# Patient Record
Sex: MALE | Race: WHITE | NOT HISPANIC OR LATINO | URBAN - METROPOLITAN AREA
[De-identification: names, ages, dates, MRNs, and addresses within clinical notes are randomized per-mention and may not be internally consistent; named-entity substitution may affect disease eponyms.]

---

## 2017-01-15 ENCOUNTER — HOSPITAL ENCOUNTER (OUTPATIENT)
Dept: URGENT CARE | Facility: CLINIC | Age: 22
Setting detail: SPECIMEN
Discharge: HOME | End: 2017-01-15
Attending: FAMILY MEDICINE | Admitting: FAMILY MEDICINE

## 2017-01-15 LAB
ALBUMIN SERPL-MCNC: 3.2 G/DL (ref 3.5–4.8)
ALBUMIN/GLOB SERPL: 0.7 {RATIO} (ref 1–1.7)
ALP SERPL-CCNC: 280 IU/L (ref 32–91)
ALT SERPL-CCNC: 243 IU/L (ref 17–63)
ANION GAP SERPL CALC-SCNC: 14.3 MMOL/L (ref 10–20)
AST SERPL-CCNC: 96 IU/L (ref 15–41)
BILIRUB SERPL-MCNC: 1.7 MG/DL (ref 0.3–1.2)
BUN SERPL-MCNC: 8 MG/DL (ref 8–20)
BUN/CREAT SERPL: 7.3 (ref 6.2–20.3)
CALCIUM SERPL-MCNC: 8.4 MG/DL (ref 8.9–10.3)
CHLORIDE SERPL-SCNC: 95 MMOL/L (ref 101–111)
CONV CO2: 27 MMOL/L (ref 22–32)
CONV TOTAL PROTEIN: 7.9 G/DL (ref 6.1–7.9)
CREAT UR-MCNC: 1.1 MG/DL (ref 0.7–1.2)
GLOBULIN UR ELPH-MCNC: 4.7 G/DL (ref 2.5–3.8)
GLUCOSE SERPL-MCNC: 90 MG/DL (ref 65–99)
POTASSIUM SERPL-SCNC: 4.3 MMOL/L (ref 3.6–5.1)
SODIUM SERPL-SCNC: 132 MMOL/L (ref 136–144)

## 2017-01-17 LAB — CMV IGM SERPL IA-ACNC: NEGATIVE

## 2017-01-18 LAB
EBV AB TO VIRAL CAPSID AG, IGG: ABNORMAL
EBV AB TO VIRAL CAPSID AG, IGM: ABNORMAL
EBV EA AB TITR SER IF: NEGATIVE {TITER}
EBV NA AB TITR SER IF: NEGATIVE {TITER}

## 2018-01-15 ENCOUNTER — OFFICE VISIT (OUTPATIENT)
Dept: FAMILY MEDICINE CLINIC | Facility: CLINIC | Age: 23
End: 2018-01-15

## 2018-01-15 VITALS
HEIGHT: 71 IN | DIASTOLIC BLOOD PRESSURE: 58 MMHG | TEMPERATURE: 97.9 F | BODY MASS INDEX: 24.88 KG/M2 | WEIGHT: 177.7 LBS | HEART RATE: 82 BPM | OXYGEN SATURATION: 99 % | SYSTOLIC BLOOD PRESSURE: 108 MMHG

## 2018-01-15 DIAGNOSIS — Z00.00 HEALTH CARE MAINTENANCE: Primary | ICD-10-CM

## 2018-01-15 DIAGNOSIS — M77.9 TENDONITIS: ICD-10-CM

## 2018-01-15 PROCEDURE — 99395 PREV VISIT EST AGE 18-39: CPT | Performed by: FAMILY MEDICINE

## 2018-01-15 RX ORDER — DEXTROAMPHETAMINE SACCHARATE, AMPHETAMINE ASPARTATE, DEXTROAMPHETAMINE SULFATE AND AMPHETAMINE SULFATE 5; 5; 5; 5 MG/1; MG/1; MG/1; MG/1
40 TABLET ORAL DAILY
COMMUNITY

## 2018-01-15 NOTE — PROGRESS NOTES
"Vera Azevedo is a 22 y.o. male.     Chief Complaint   Patient presents with   • Establish Care     new pt    • Extremity Weakness     left hand weakness cant make a fist x 2 months   • Depression        History of Present Illness     New patient.  Here to get established.  Annual health maintenance examination.  He has not been to a family doctor in some time.    Depression history.  Reported attention deficit disorder.  He is followed by psychiatrist.  He was prescribed Adderall a few months ago.  He states occasionally makes him agitated.  Once he took a couple pills at once and made his heart rate faster.  But better now.  He states his depression has been better.  He has a girlfriend and he feels better about himself.  He gets anxious at times.  He's periodically worried about his left hand right now.  He states when he makes a fist the ring finger does not completely flex.  He wakes up in the morning and it feels a little \"different\".  No numbness.  No pain.  No definite weakness.  Only bothers him in the morning.  He is left-handed.  No particular repetitive motion.  He uses a mouse with his right hand.  He is a student.    Social History   Substance Use Topics   • Smoking status: Never Smoker   • Smokeless tobacco: Never Used   • Alcohol use Yes      Comment: rare       There is no immunization history on file for this patient.    Family History   Problem Relation Age of Onset   • No Known Problems Mother    • No Known Problems Father              The following portions of the patient's history were reviewed and updated as appropriate: allergies, current medications, past family history, past medical history, past social history, past surgical history and problem list.          Review of Systems   Constitutional: Negative.    HENT: Negative.    Respiratory: Negative.    Cardiovascular: Negative.    Gastrointestinal: Negative.  Negative for blood in stool.   Endocrine: Negative.    Genitourinary: " "Negative.  Negative for hematuria.   Musculoskeletal: Negative.    Skin: Negative.    Neurological: Negative.  Negative for weakness, numbness and headaches.   Psychiatric/Behavioral: The patient is nervous/anxious.    All other systems reviewed and are negative.      Objective   Blood pressure 108/58, pulse 82, temperature 97.9 °F (36.6 °C), temperature source Oral, height 180.3 cm (71\"), weight 80.6 kg (177 lb 11.2 oz), SpO2 99 %.  Physical Exam   Constitutional: He is oriented to person, place, and time. He appears well-developed and well-nourished. No distress.   No acute distress.  Nontoxic.   HENT:   Right Ear: Tympanic membrane, external ear and ear canal normal.   Left Ear: Tympanic membrane, external ear and ear canal normal.   Nose: Nose normal.   Mouth/Throat: Oropharynx is clear and moist. No oropharyngeal exudate.   Eyes: Conjunctivae are normal. Right eye exhibits no discharge. Left eye exhibits no discharge. No scleral icterus.   Neck: No thyromegaly present.   Cardiovascular: Normal rate, regular rhythm, normal heart sounds and intact distal pulses.    Pulmonary/Chest: Effort normal and breath sounds normal. No stridor. No respiratory distress. He has no wheezes. He has no rales.   No tachypnea   Abdominal: Soft. Bowel sounds are normal. He exhibits no distension and no mass. There is no tenderness. There is no rebound and no guarding.   Musculoskeletal: He exhibits no edema, tenderness or deformity.   Examination left hand reveals full range of motion.  When he makes a fist with his left hand the left fourth DIP joint appears not completely flex.  Negative Tinel's.  Negative Phalen's.  No thenar or hyperthenar muscle wasting.  The neurovascular tendon status is intact.   Lymphadenopathy:     He has no cervical adenopathy.   Neurological: He is alert and oriented to person, place, and time. He exhibits normal muscle tone. Coordination normal.   Skin: Skin is warm and dry. No rash noted. "   Psychiatric: He has a normal mood and affect. His behavior is normal. Judgment and thought content normal.   Nursing note and vitals reviewed.      Assessment/Plan   Luis was seen today for establish care, extremity weakness and depression.    Diagnoses and all orders for this visit:    Health care maintenance  -     Lipid Panel  -     Comprehensive Metabolic Panel  -     HIV-1 / O / 2 Ag / Antibody 4th Generation    Tendonitis  -     Ambulatory Referral to Hand Surgery      Here for annual complete physical examination.  New patient.    Immunizations.  Reported up-to-date.  He is going to get us records of his immunizations at [facility].    Patient requests routine HIV screening.  He states he has no pelvic or concerns 1 STDs.  No STD symptoms, including no urogenital symptoms.    Inability to completely flex the left fourth DIP joint.  A very subtle finding.  Is making him very anxious.  There is no obvious trigger finger or definitive tendinopathy.  Possible mild tendinitis.  No evidence of carpal tunnel syndrome at this moment.  I offered reassurance, patient like further investigation.  I referred him to a hand doctor.    Anxiety.  History of reported attention deficit disorder.  History of reported depression that appears to be in remission.  He is followed by a psychiatrist.  Patient does state the Adderall makes him agitated at times.  I'm concerned may be increasing some of his anxiety.  I recommend he get back to his psychiatrist for evaluation.  He states he has an appointment later this week.  There is no current evidence of OCD.

## 2018-05-24 ENCOUNTER — OFFICE VISIT (OUTPATIENT)
Dept: FAMILY MEDICINE CLINIC | Facility: CLINIC | Age: 23
End: 2018-05-24

## 2018-05-24 VITALS
SYSTOLIC BLOOD PRESSURE: 112 MMHG | BODY MASS INDEX: 25.09 KG/M2 | WEIGHT: 179.2 LBS | HEIGHT: 71 IN | TEMPERATURE: 97.8 F | OXYGEN SATURATION: 99 % | HEART RATE: 84 BPM | DIASTOLIC BLOOD PRESSURE: 80 MMHG

## 2018-05-24 DIAGNOSIS — M76.31 ILIOTIBIAL BAND TENDINITIS OF RIGHT SIDE: Primary | ICD-10-CM

## 2018-05-24 PROCEDURE — 99213 OFFICE O/P EST LOW 20 MIN: CPT | Performed by: FAMILY MEDICINE

## 2018-05-24 NOTE — PROGRESS NOTES
"Vera Azevedo is a 22 y.o. male.     Chief Complaint   Patient presents with   • Hip Pain     right hip pain NKI, x 2-3 month    • Hand Pain     left hand left x nov, hard to make a fist, decreased strength         History of Present Illness    Right hip pain.  2 or 3 months.  Better with exercise such as running.  Bothers him at nighttime times sometimes.  He feels a click now and then on the right hip.  Mild to moderate symptoms.  Knocking the way of his day-to-day activity.  Worse when he is walking uphill.    Intermittent left hand discomfort.  States it's hard to make a fist.  Questionable decreased strength.  No left-sided facial symptoms.  No left leg symptoms.  No weakness.  No numbness.  No injury.  Previous examinations are unremarkable.      The following portions of the patient's history were reviewed and updated as appropriate: allergies, current medications, past family history, past medical history, past social history, past surgical history and problem list.          Review of Systems   Constitutional: Negative.    Musculoskeletal: Negative for arthralgias.   Neurological: Negative.    Psychiatric/Behavioral: Negative.        Objective   Blood pressure 112/80, pulse 84, temperature 97.8 °F (36.6 °C), temperature source Oral, height 180.3 cm (70.98\"), weight 81.3 kg (179 lb 3.2 oz), SpO2 99 %.  Physical Exam   Constitutional: No distress.   Musculoskeletal:   Right hip full range of motion.  Strength 5 out of 5.  His pain to palpation along the iliotibial band on the right side been no bursitis discomfort.  Hamstrings appear tight.    Examination left hand is unremarkable.       Assessment/Plan   Luis was seen today for hip pain and hand pain.    Diagnoses and all orders for this visit:    Iliotibial band tendinitis of right side  -     Ambulatory Referral to Physical Therapy Evaluate and treat      Iliotibial band syndrome right-sided.  Referral to physical therapy for strengthening and " stretching.  If not improving referral to sports medicine for injection of corticosteroid.    Left hand discomfort.  Exam again is unremarkable.  Likely not a upper motor neuron issue.  Recommend referral to hand surgeon.  I gave the patient name of a hand surgeon in this building they can self refer.  If insurance referral needed let us know.

## 2018-06-08 ENCOUNTER — TREATMENT (OUTPATIENT)
Dept: PHYSICAL THERAPY | Facility: CLINIC | Age: 23
End: 2018-06-08

## 2018-06-08 DIAGNOSIS — M76.31 ILIOTIBIAL BAND TENDINITIS OF RIGHT SIDE: Primary | ICD-10-CM

## 2018-06-08 PROCEDURE — 97014 ELECTRIC STIMULATION THERAPY: CPT | Performed by: PHYSICAL THERAPIST

## 2018-06-08 PROCEDURE — 97161 PT EVAL LOW COMPLEX 20 MIN: CPT | Performed by: PHYSICAL THERAPIST

## 2018-06-08 PROCEDURE — 97110 THERAPEUTIC EXERCISES: CPT | Performed by: PHYSICAL THERAPIST

## 2018-06-08 NOTE — PROGRESS NOTES
Physical Therapy Initial Evaluation and Plan of Care    TIME IN 1103 TIME OUT 1141  Patient: Luis Azevedo   : 1995  Diagnosis/ICD-10 Code:  Iliotibial band tendinitis of right side [M76.31]  Referring practitioner: Sohail Roe MD    Subjective Evaluation    History of Present Illness  Date of onset: 2018  Mechanism of injury: Pt reports insidious onset of Right leg pain that is aggravated with walking.  Pt reports walking with laptop bag in January or February and bag kept hitting his right side and felt pain so he stopped carrying his bag that way.      PMH: denies previous injury to right lower leg      Subjective comment: Pt also reports decreased  strength and stiffness in Left hand.    Patient Occupation: student Pain  Current pain ratin  At best pain ratin  At worst pain ratin  Location: right lateral thigh  Quality: constant, hurts   Alleviating factors: first getting up in the morning.  Aggravating factors: ambulation (running, getting in and out of shower)  Progression: no change    Hand dominance: left    Diagnostic Tests  No diagnostic tests performed    Treatments  No previous or current treatments  Patient Goals  Patient goals for therapy: decreased pain (running on treadmill without pain, walking at gym)             Objective       Palpation     Right   No palpable tenderness to the rectus femoris.   Hypertonic in the gluteus medius and piriformis. Tenderness of the gluteus medius and piriformis.     Tenderness     Right Hip   Tenderness in the greater trochanter.     Additional Tenderness Details  Right iliotibial band (+) TTP    Strength/Myotome Testing     Right Hip   Planes of Motion   Flexion: 5  Extension: 4    Isolated Muscles   Gluteus maximums: 3+ (increased lateral hip pain)    Right Knee   Extension: 4+    Tests     Right Hip   Negative ORTIZ, KATHY and jae.     Additional Tests Details  TORO adduction drop test R (-) L unable to test due to pain lying on  "Right side  Trendelenburg Right (+)      Ambulation     Observational Gait   Decreased right stance time and left step length.     Comments   Left innominate anterior rotation and medial rotation    Functional Assessment   Squat   Pain, sitting toward left side, left tibial anterior translation beyond toes and right tibial anterior translation beyond toes.     Forward Step Down 6\"   Left Leg  Valgus.     Right Leg  Pain, positive Trendelenburg, ipsilateral trunk side bending and valgus.     Single Leg Stance   Right: 3 (increased pain) seconds         Assessment & Plan     Assessment  Impairments: abnormal gait, impaired balance, impaired physical strength, lacks appropriate home exercise program and pain with function  Assessment details: Pt is pleasant 22 y.o. Male who presents with right anterior and lateral hip pain.  Pt reports no known injury.  Pt demonstrates pain in single-leg stance and with ambulation.  Pt demonstrates weakness and pain with resisted testing for hip abduction and TTP in right gluteus medius indicating strain in gluteus medius.  Pt also demonstrates weak and painful hip flexion resisted test and possible left anterior innominate rotation.  Pt requires skilled physical therapy to address impairments and return to prior level of function including walking and running without pain.   Prognosis: good  Functional Limitations: walking, pushing and uncomfortable because of pain  Goals  Plan Goals: Short-Term Goals - In 2 weeks:  1. Pt will be (I) with initial HEP.  2. Sahrmann for lumbar stabilization 0.1/5 with ability to maintain neutral spine during supine heel slide.  3. Pt will perform single leg balance on right without pain for 15 seconds in order to demonstrate improved balance and tolerance to activity for ambulation and stair navigation.    Long-term Goals - In 4 weeks:  1. Pt will ambulate community distances with equal stance time, step length, and without pain.  2. Pt will navigate " stairs reciprocally without pain.  3. Pt will perform step down from 4 inch step without pain, hip IR or contralateral pelvic drop.  4. Right hip flexion, abduction and extension strength improve to 5-/5 without pain.  5. Right ITB, gluteus medius, and piriformis (-) TTP.  6. LEFS score improve to 60/80 to demonstrate functional improvement.     Plan  Therapy options: will be seen for skilled physical therapy services  Planned modality interventions: cryotherapy, electrical stimulation/Russian stimulation and thermotherapy (hydrocollator packs)  Planned therapy interventions: ADL retraining, balance/weight-bearing training, body mechanics training, functional ROM exercises, gait training, home exercise program, joint mobilization, neuromuscular re-education, soft tissue mobilization, strengthening and therapeutic activities  Frequency: 2x week  Duration in weeks: 4  Treatment plan discussed with: patient        Manual Therapy:    -     mins  18535;  Therapeutic Exercise:    8     mins  57124;     Neuromuscular Aracelis:    -    mins  19568;    Therapeutic Activity:     -     mins  94574;     Gait Training:      -     mins  76573;     Ultrasound:     -     mins  74606;    Electrical Stimulation:    10     mins  24580 ( );  Dry Needling     -     mins self-pay    Timed Treatment:   8   mins   Total Treatment:     38   mins    PT SIGNATURE: Anastacia Hdz, PT, DPT   DATE TREATMENT INITIATED: 6/8/2018    Initial Certification  Certification Period: 9/6/2018  I certify that the therapy services are furnished while this patient is under my care.  The services outlined above are required by this patient, and will be reviewed every 90 days.     PHYSICIAN: Sohail Roe MD      DATE:     Please sign and return via fax to [phone]. Thank you, [facility].

## 2018-06-12 ENCOUNTER — TREATMENT (OUTPATIENT)
Dept: PHYSICAL THERAPY | Facility: CLINIC | Age: 23
End: 2018-06-12

## 2018-06-12 DIAGNOSIS — M76.31 ILIOTIBIAL BAND TENDINITIS OF RIGHT SIDE: Primary | ICD-10-CM

## 2018-06-12 PROCEDURE — 97110 THERAPEUTIC EXERCISES: CPT | Performed by: PHYSICAL THERAPIST

## 2018-06-12 NOTE — PROGRESS NOTES
Physical Therapy Daily Progress Note    Time In 1700  Time Out 1742    Luis Azevedo reports: some improvement in symptoms for 4 hours after stretching last treatment.     Subjective     Objective       Functional Assessment   Squat   Sitting toward left side.      See Exercise, Manual, and Modality Logs for complete treatment.       Assessment & Plan     Assessment  Assessment details: Plan of treatment session modified due to pt late arrival and arriving in jeans.  Pt demonstrated inability to perform hip hike with weight bearing on Right LE due to weakness and pain.  Pt demonstrated increased WB on L during controlled sit <-> stand and was instructed on staggered stance to increase WB on Right.  Pt reported discomfort with heat application in side-lying and treatment was terminated.        Progress per Plan of Care  Apply modalities including head in seated position         Manual Therapy:    -     mins  93291;  Therapeutic Exercise:    30     mins  76793;     Neuromuscular Aracelis:    -    mins  72021;    Therapeutic Activity:     -     mins  16050;     Gait Training:      -     mins  63234;     Ultrasound:     -     mins  44557;    Electrical Stimulation:    -     mins  23204 ( );  Dry Needling     -     mins self-pay    Timed Treatment:   30   mins   Total Treatment:     42   mins    Anastacia Hdz, PT, DPT  Physical Therapist

## 2018-06-13 NOTE — PATIENT INSTRUCTIONS
Pathology and involved anatomy  Purpose of treatment  Appropriate response to treatment  Avoid running, avoid activities that increase pain  Perform weight lifting including bilateral leg press  Continued HEP performance

## 2018-06-15 ENCOUNTER — TREATMENT (OUTPATIENT)
Dept: PHYSICAL THERAPY | Facility: CLINIC | Age: 23
End: 2018-06-15

## 2018-06-15 DIAGNOSIS — M76.31 ILIOTIBIAL BAND TENDINITIS OF RIGHT SIDE: Primary | ICD-10-CM

## 2018-06-15 PROCEDURE — 97530 THERAPEUTIC ACTIVITIES: CPT | Performed by: PHYSICAL THERAPIST

## 2018-06-15 PROCEDURE — 97110 THERAPEUTIC EXERCISES: CPT | Performed by: PHYSICAL THERAPIST

## 2018-06-15 PROCEDURE — 97014 ELECTRIC STIMULATION THERAPY: CPT | Performed by: PHYSICAL THERAPIST

## 2018-06-15 PROCEDURE — 97140 MANUAL THERAPY 1/> REGIONS: CPT | Performed by: PHYSICAL THERAPIST

## 2018-06-15 NOTE — PROGRESS NOTES
Physical Therapy Daily Progress Note    Time In 1301  Time Out 1413    Luis Azevedo reports: doing well, just had follow up with hand doctor     Subjective     Objective       Tests     Additional Tests Details  Supine leg length (+) left longer       See Exercise, Manual, and Modality Logs for complete treatment.       Assessment & Plan     Assessment  Assessment details: Initiated instrument assisted soft tissue massage to right hip and ITB.  Pt reported discomfort during manual treatment and agreed to continue soft tissue massage.  Initiated additional hip strengthening today.  Majority of strengthening performed NWB to improve tolerance to activity.          Progress per Plan of Care           Manual Therapy:    10     mins  62290;  Therapeutic Exercise:    37     mins  42036;     Neuromuscular Aracelis:    -    mins  76850;    Therapeutic Activity:     8     mins  26601;     Gait Training:      -     mins  15968;     Ultrasound:     -     mins  21447;    Electrical Stimulation:    15     mins  63480 ( );  Dry Needling     -     mins self-pay    Timed Treatment:   55   mins   Total Treatment:     72   mins    Anastacia Hdz, PT, DPT  Physical Therapist

## 2018-06-19 ENCOUNTER — TREATMENT (OUTPATIENT)
Dept: PHYSICAL THERAPY | Facility: CLINIC | Age: 23
End: 2018-06-19

## 2018-06-19 DIAGNOSIS — M76.31 ILIOTIBIAL BAND TENDINITIS OF RIGHT SIDE: Primary | ICD-10-CM

## 2018-06-19 PROCEDURE — 97140 MANUAL THERAPY 1/> REGIONS: CPT | Performed by: PHYSICAL THERAPIST

## 2018-06-19 PROCEDURE — 97110 THERAPEUTIC EXERCISES: CPT | Performed by: PHYSICAL THERAPIST

## 2018-06-19 PROCEDURE — 97014 ELECTRIC STIMULATION THERAPY: CPT | Performed by: PHYSICAL THERAPIST

## 2018-06-19 NOTE — PROGRESS NOTES
Physical Therapy Daily Progress Note    Time In 1532  Time Out 1628    Luis Azevedo reports: hip feels a little better and compliance with HEP.  Pt reports no pain yesterday, but felt soreness again this morning.     Subjective     Objective       Tests     Additional Tests Details  Supine leg length (-)     See Exercise, Manual, and Modality Logs for complete treatment.       Assessment & Plan     Assessment  Assessment details: Pt demonstrates some progress in symptoms and tolerance to activity.  Hip strengthening and dynamic stretching continued today due to favorable response of no increased pain after last treatment and less pain yesterday.   Pt demonstrated greatest soft tissue restrictions in tendon attachments to greater trochanter and gluteus medius during instrument assisted soft tissue massage.        Progress strengthening /stabilization /functional activity           Manual Therapy:    10     mins  73546;  Therapeutic Exercise:    28     mins  83392;     Neuromuscular Aracelis:    -    mins  91906;    Therapeutic Activity:     -     mins  64813;     Gait Training:      -     mins  20445;     Ultrasound:     -     mins  14367;    Electrical Stimulation:    15     mins  13536 ( );  Dry Needling     -     mins self-pay    Timed Treatment:   38   mins   Total Treatment:     56   mins    Anastacia Hdz, PT, DPT  Physical Therapist

## 2018-06-22 ENCOUNTER — TREATMENT (OUTPATIENT)
Dept: PHYSICAL THERAPY | Facility: CLINIC | Age: 23
End: 2018-06-22

## 2018-06-22 DIAGNOSIS — M76.31 ILIOTIBIAL BAND TENDINITIS OF RIGHT SIDE: Primary | ICD-10-CM

## 2018-06-22 PROCEDURE — 97014 ELECTRIC STIMULATION THERAPY: CPT | Performed by: PHYSICAL THERAPIST

## 2018-06-22 PROCEDURE — 97140 MANUAL THERAPY 1/> REGIONS: CPT | Performed by: PHYSICAL THERAPIST

## 2018-06-22 PROCEDURE — 97110 THERAPEUTIC EXERCISES: CPT | Performed by: PHYSICAL THERAPIST

## 2018-06-22 NOTE — PROGRESS NOTES
Physical Therapy Daily Progress Note    Time In 1335  Time Out 1442    Luis Hill reports: no current hip pain, but had pain in hip when at nerve conduction study for hand earlier today.     Subjective     Objective       Tests     Additional Tests Details  Supine leg length (+) left longer       See Exercise, Manual, and Modality Logs for complete treatment.       Assessment & Plan     Assessment  Assessment details: Pt requires mild verbal cues to maintain level pelvis and for pacing of activity.  Pt demonstrates some reduction in frequency of symptoms and tolerance to activity with no pain pre-treatment today.   Pt also was able to perform supine right hip ER without pain today.          Progress strengthening /stabilization /functional activity  Progress hip ER and abduction strengthening as indicated and to pt tolerance.           Manual Therapy:    8     mins  69300;  Therapeutic Exercise:    37     mins  44350;     Neuromuscular Aracelis:    -    mins  09528;    Therapeutic Activity:     -     mins  77716;     Gait Training:      -     mins  52296;     Ultrasound:     -     mins  40945;    Electrical Stimulation:    15     mins  33561 ( );  Dry Needling     -     mins self-pay    Timed Treatment:   45   mins   Total Treatment:     67   mins    Anastacia Hdz, PT, DPT  Physical Therapist

## 2018-06-28 ENCOUNTER — TREATMENT (OUTPATIENT)
Dept: PHYSICAL THERAPY | Facility: CLINIC | Age: 23
End: 2018-06-28

## 2018-06-28 DIAGNOSIS — M76.31 ILIOTIBIAL BAND TENDINITIS OF RIGHT SIDE: Primary | ICD-10-CM

## 2018-06-28 PROCEDURE — 97110 THERAPEUTIC EXERCISES: CPT | Performed by: PHYSICAL THERAPIST

## 2018-06-28 PROCEDURE — 97014 ELECTRIC STIMULATION THERAPY: CPT | Performed by: PHYSICAL THERAPIST

## 2018-06-28 NOTE — PROGRESS NOTES
"Physical Therapy Daily Progress Note    Time In 1404  Time Out 1514    Luis Azevedo reports: hip has reverted backwards and is painful with weight bearing.  Pt reports wrist feels better but presents with script for wrist/hand evaluation and treatment.  Pt reports sitting in class for 6 hours straight and also feeling pain on left hip starting. Pt c/o \"stiffness\" in entire right thigh.      Subjective     Objective       Palpation     Right   No palpable tenderness to the piriformis.   Hypertonic in the gluteus medius. Tenderness of the gluteus medius.     Tenderness     Right Hip   Tenderness in the greater trochanter.      See Exercise, Manual, and Modality Logs for complete treatment.       Assessment & Plan     Assessment  Assessment details: Pt demonstrated difficulty reporting symptoms today.  Pt reported continued hip pain but entire right thigh \"tightness\".  Initiated additional LE strengthening including quadriceps strengthening today due to new complaints of stiffness and pain.  Pt educated on self-management strategies of taking breaks from sitting or performing dynamic hip strengthening exercises while sitting to decrease pain and promote mobility.         Progress strengthening /stabilization /functional activity  Assess wrist/hand next visit and left hip if indicated         Manual Therapy:    8     mins  92570;  Therapeutic Exercise:    42     mins  49701;     Neuromuscular Aracelis:    -    mins  92377;    Therapeutic Activity:     -     mins  43195;     Gait Training:      -     mins  81968;     Ultrasound:     -     mins  83174;    Electrical Stimulation:    15     mins  48544 ( );  Dry Needling     -     mins self-pay    Timed Treatment:   50   mins   Total Treatment:     70   mins    Anastacia Hdz, PT, DPT  Physical Therapist    "

## 2018-06-28 NOTE — PATIENT INSTRUCTIONS
Purpose of treatment   Frequent education on appropriate response to treatment and to avoid increased pain

## 2018-07-05 ENCOUNTER — TREATMENT (OUTPATIENT)
Dept: PHYSICAL THERAPY | Facility: CLINIC | Age: 23
End: 2018-07-05

## 2018-07-05 DIAGNOSIS — M76.31 ILIOTIBIAL BAND TENDINITIS OF RIGHT SIDE: Primary | ICD-10-CM

## 2018-07-05 DIAGNOSIS — M79.642 PAIN OF LEFT HAND: ICD-10-CM

## 2018-07-05 DIAGNOSIS — R29.898 DECREASED GRIP STRENGTH OF LEFT HAND: ICD-10-CM

## 2018-07-05 PROCEDURE — 97110 THERAPEUTIC EXERCISES: CPT | Performed by: PHYSICAL THERAPIST

## 2018-07-05 PROCEDURE — 97112 NEUROMUSCULAR REEDUCATION: CPT | Performed by: PHYSICAL THERAPIST

## 2018-07-05 PROCEDURE — 97014 ELECTRIC STIMULATION THERAPY: CPT | Performed by: PHYSICAL THERAPIST

## 2018-07-05 PROCEDURE — 97164 PT RE-EVAL EST PLAN CARE: CPT | Performed by: PHYSICAL THERAPIST

## 2018-07-05 NOTE — PATIENT INSTRUCTIONS
Writing in brace  Appropriate response to treatment   HEP performance  Handouts given for nerve and tendon glides  Please view [facility] Luis Azevedo for a complete list of HEP instructions.

## 2018-07-05 NOTE — PROGRESS NOTES
Re-Assessment / Re-Certification    Time In 1420     Time Out 1520    Patient: Luis Azevedo   : 1995  Diagnosis/ICD-10 Code:  Iliotibial band tendinitis of right side [M76.31]  Referring practitioner: Sohail Roe MD  Date of Initial Visit: 2018  Today's Date: 2018  Patient seen for 6 sessions    Subjective:   Luis Azevedo reports: medicine that he is taking for hand feels like it is working, but unknown what the medicine is called.  Pt reports some stiffness in hand when walking around and less popping.  Pt reports improved symptoms in right hip and able to walk [place] without significant hip pain.  Pt reports stair navigation continues to increase pain.   Subjective Questionnaire: DASH: 18%;  LEFS: 45/80  Clinical Progress: improved  Home Program Compliance: Yes  Treatment has included: therapeutic exercise, neuromuscular re-education, manual therapy, therapeutic activity, electrical stimulation and moist heat    Subjective Evaluation    History of Present Illness  Date of onset: 2017  Mechanism of injury: Pt reports onset of left hand pain November of last year after popping knuckles.      Subjective comment: Pt had evaluation with hand specialist Dr. Falcon and diagnosis of tendinitis and nerve conduction study negative.  MD referral to physical therapy for Left hand strengthening and AROM and PROM. Pain  Location: palmar surface of metacarpal phalangeal joints also c/o decreased  strength  Quality: stiffness, impaired sensation.  Relieving factors: medications, rest and heat  Exacerbated by: writing for a long time.  Progression: improved    Hand dominance: left    Diagnostic Tests  No diagnostic tests performed    Treatments  Previous treatment: medication  Current treatment: medication  Patient Goals  Patient goals for therapy: increased strength         Objective       Active Range of Motion     Left Wrist   Wrist flexion: WFL  Wrist extension: WFL    Strength/Myotome  Testing     Left Wrist/Hand   Wrist extension: 5  Wrist flexion: 5  Radial deviation: 5  Ulnar deviation: 5     (2nd hand position)     Trial 1: 110 lbs    Trial 2: 95 lbs    Trial 3: 90 lbs    Average: 98.33 lbs    Comments: Elbow bend to 90 degrees        Right Wrist/Hand      (2nd hand position)     Trial 1: 115 lbs    Trial 2: 109 lbs    Trial 3: 85 lbs    Average: 103 lbs    Comments: Elbow bend to 90 degrees          Tests     Left Elbow   Positive Tinel's sign (cubital tunnel).     Left Wrist/Hand   Negative Phalen's sign and Tinel's sign (medial nerve).     Additional Tests Details  Left reverse phalen's (-)  Sahrmann for lumbar stabilization 0.1/5 with inability to maintain neutral spine during supine heel slide       Assessment & Plan     Assessment  Impairments: impaired balance, impaired physical strength and pain with function  Assessment details: Treatment and re-assessment prioritized due to pt late arrival.  Evaluation focused on wrist and hand and region added to treatment plan today.  Pt demonstrates signs and symptoms consistent with MD diagnosis of hand tendinitis and tenosynovitis.  Pt also demonstrates increased ulnar nerve tension with a positive Tinel's in cubital tunnel.  Pt demonstrates weakness in left  strength.    Right hip symptoms and related function demonstrate improvement since initial evaluation.  Pt continues to demonstrate decreased local lumbopelvic control/stabilization and reports pain with stair navigation.  Pt demonstrates some progress towards previous goals.    Pt requires continued skilled physical therapy to address impairments and improve ability to walk, navigate stairs, write, and use computer as required for school  Prognosis: good  Functional Limitations: carrying objects, walking, uncomfortable because of pain and standing  Plan  Therapy options: will be seen for skilled physical therapy services  Planned modality interventions: cryotherapy, electrical  stimulation/Russian stimulation and thermotherapy (hydrocollator packs)  Planned therapy interventions: abdominal trunk stabilization, balance/weight-bearing training, body mechanics training, functional ROM exercises, home exercise program, joint mobilization, manual therapy, neuromuscular re-education, soft tissue mobilization, strengthening, stretching and therapeutic activities  Frequency: 2x week  Duration in weeks: 3  Treatment plan discussed with: patient      Progress toward previous goals: Partially Met    New Goals  Short-term goals (STG): In 1 week:  1. Sahrmann for lumbar stabilization 0.1/5 with ability to maintain neutral spine during supine heel slide.  2. Pt will perform single leg balance on right without pain for 15 seconds in order to demonstrate improved balance and tolerance to activity for ambulation and stair navigation.  3. ULTT median and ulnar nerve (-).    Long-term goals (LTG): In 3 weeks:  1. Pt will navigate stairs reciprocally without pain.  2. Pt will perform step down from 4 inch step without pain, hip IR or contralateral pelvic drop.  3. Right hip flexion, abduction and extension strength improve to 5-/5 without pain.  4. Right ITB, gluteus medius, and piriformis (-) TTP.  5. LEFS score improve to 60/80 to demonstrate functional improvement.   6. Left  strength improve to 103 lb without pain.  7. Left 2nd through 5th digits interphalangeal and metacarpal phalangeal joints AROM WNL and pain-free.  8. Pt will type and write as required for school without pain.      Recommendations: Continue as planned with addition of treatment for Left wrist and hand  Timeframe: 3 weeks  Prognosis to achieve goals: good    PT Signature: Anastacia Hdz, PT, DPT      Based upon review of the patient's progress and continued therapy plan, it is my medical opinion that Luis Azevedo should continue physical therapy treatment at [facility] PHYS[facility] THERAP[facility]  [id number]  [address] [address]  [phone].    Signature: __________________________________  Sohail Roe MD    Manual Therapy:    -     mins  38265;  Therapeutic Exercise:    30     mins  08732;     Neuromuscular Aracelis:    10    mins  04409;    Therapeutic Activity:     -     mins  18126;     Gait Training:      -     mins  37626;     Ultrasound:     -     mins  89576;    Electrical Stimulation:    15     mins  61522 ( );  Dry Needling     -     mins self-pay    Timed Treatment:   40   mins   Total Treatment:     60   mins    Please sign and return via fax to [phone]. Thank you, [facility].

## 2018-07-10 ENCOUNTER — TREATMENT (OUTPATIENT)
Dept: PHYSICAL THERAPY | Facility: CLINIC | Age: 23
End: 2018-07-10

## 2018-07-10 DIAGNOSIS — R29.898 DECREASED GRIP STRENGTH OF LEFT HAND: ICD-10-CM

## 2018-07-10 DIAGNOSIS — M76.31 ILIOTIBIAL BAND TENDINITIS OF RIGHT SIDE: Primary | ICD-10-CM

## 2018-07-10 DIAGNOSIS — M79.642 PAIN OF LEFT HAND: ICD-10-CM

## 2018-07-10 PROCEDURE — 97110 THERAPEUTIC EXERCISES: CPT | Performed by: PHYSICAL THERAPIST

## 2018-07-10 PROCEDURE — 97014 ELECTRIC STIMULATION THERAPY: CPT | Performed by: PHYSICAL THERAPIST

## 2018-07-10 PROCEDURE — 97112 NEUROMUSCULAR REEDUCATION: CPT | Performed by: PHYSICAL THERAPIST

## 2018-07-10 NOTE — PROGRESS NOTES
Physical Therapy Daily Progress Note    Time In 1403  Time Out 1512    Luis Azevedo reports: hip feels better when walking, but still feels that hip is weak.  Pt reports hand is about the same.     Subjective     Objective   See Exercise, Manual, and Modality Logs for complete treatment.       Assessment & Plan     Assessment  Assessment details: Progressed some LE and hip exercises today with increased step height and resistance.  Pt demonstrated increased tolerance to activity with ability to perform increased repetitions of LE exercises without increased hip pain. A few exercises were modified or deferred due to pt report of reproduction of lateral hip pain.  Pt required review of appropriate response to activity and appropriate load for strengthening without pain.        Progress strengthening /stabilization /functional activity           Manual Therapy:    -     mins  06991;  Therapeutic Exercise:    35     mins  28102;     Neuromuscular Aracelis:    10    mins  92220;    Therapeutic Activity:     -     mins  95778;     Gait Training:      -     mins  37811;     Ultrasound:     -     mins  66212;    Electrical Stimulation:    15     mins  45182 ( );  Dry Needling     -     mins self-pay    Timed Treatment:   45   mins   Total Treatment:     69   mins    Anastacia Hdz, PT, DPT  Physical Therapist

## 2018-07-12 ENCOUNTER — TREATMENT (OUTPATIENT)
Dept: PHYSICAL THERAPY | Facility: CLINIC | Age: 23
End: 2018-07-12

## 2018-07-12 DIAGNOSIS — M79.642 PAIN OF LEFT HAND: ICD-10-CM

## 2018-07-12 DIAGNOSIS — R29.898 DECREASED GRIP STRENGTH OF LEFT HAND: ICD-10-CM

## 2018-07-12 DIAGNOSIS — M76.31 ILIOTIBIAL BAND TENDINITIS OF RIGHT SIDE: Primary | ICD-10-CM

## 2018-07-12 PROCEDURE — 97014 ELECTRIC STIMULATION THERAPY: CPT | Performed by: PHYSICAL THERAPIST

## 2018-07-12 PROCEDURE — 97110 THERAPEUTIC EXERCISES: CPT | Performed by: PHYSICAL THERAPIST

## 2018-07-12 NOTE — PROGRESS NOTES
"Physical Therapy Daily Progress Note    Time In 1408  Time Out 1515    Luis Azevedo reports: continued stiffness in hand and girlfriend noted \"coldness\" in left hand but denies numbness and tingling in hand.  Pt reports hand exercises hurt and were difficult last treatment.      Subjective     Objective   See Exercise, Manual, and Modality Logs for complete treatment.       Assessment & Plan     Assessment  Assessment details: Pt required constant education on appropriate response to treatment and purpose of treatment.  Pt continues to report difficulty with hand and finger exercises and requires frequent education on pacing of activity and degree of force.   Pt education on taking breaks from writing for stretching and stretching in the opposite direction of sustained hand position.  Pt educated on neutral wrist position to decrease stress on associated tendons.         Progress strengthening /stabilization /functional activity to tolerance           Manual Therapy:    -     mins  86360;  Therapeutic Exercise:    45     mins  62284;     Neuromuscular Aracelis:    -    mins  36472;    Therapeutic Activity:     -     mins  05667;     Gait Training:      -     mins  42249;     Ultrasound:     -     mins  33014;    Electrical Stimulation:    15     mins  40499 ( );  Dry Needling     -     mins self-pay    Timed Treatment:   45   mins   Total Treatment:     67   mins    Anastacia Hdz, PT, DPT  Physical Therapist    "

## 2018-07-17 ENCOUNTER — TREATMENT (OUTPATIENT)
Dept: PHYSICAL THERAPY | Facility: CLINIC | Age: 23
End: 2018-07-17

## 2018-07-17 DIAGNOSIS — M79.642 PAIN OF LEFT HAND: ICD-10-CM

## 2018-07-17 DIAGNOSIS — M76.31 ILIOTIBIAL BAND TENDINITIS OF RIGHT SIDE: Primary | ICD-10-CM

## 2018-07-17 DIAGNOSIS — R29.898 DECREASED GRIP STRENGTH OF LEFT HAND: ICD-10-CM

## 2018-07-17 PROCEDURE — 97110 THERAPEUTIC EXERCISES: CPT | Performed by: PHYSICAL THERAPIST

## 2018-07-17 NOTE — PROGRESS NOTES
Physical Therapy Daily Progress Note    Time In 1608  Time Out 1645    Luis Azevedo reports: wrist is sore from writing a lot and hip is little better but not the same as before injury.  Pt reports need to leave early from treatment session.     Subjective     Objective   See Exercise, Manual, and Modality Logs for complete treatment.       Assessment & Plan     Assessment  Assessment details: Treatment session prioritized due to pt time constraints.  Treatment session focused on LE due to c/o continued soreness and over-work in left wrist.  Pt required moderate verbal cues to limit trunk sway with resisted lateral walks.        Progress strengthening /stabilization /functional activity           Manual Therapy:    -     mins  67340;  Therapeutic Exercise:    35     mins  37671;     Neuromuscular Aracelis:    -    mins  00242;    Therapeutic Activity:     -     mins  33102;     Gait Training:      -     mins  55751;     Ultrasound:     -     mins  97274;    Electrical Stimulation:    -     mins  70156 ( );  Dry Needling     -     mins self-pay    Timed Treatment:   35   mins   Total Treatment:     37   mins    Anastacia Hdz, PT, DPT  Physical Therapist

## 2018-07-19 ENCOUNTER — TREATMENT (OUTPATIENT)
Dept: PHYSICAL THERAPY | Facility: CLINIC | Age: 23
End: 2018-07-19

## 2018-07-19 DIAGNOSIS — M76.31 ILIOTIBIAL BAND TENDINITIS OF RIGHT SIDE: Primary | ICD-10-CM

## 2018-07-19 DIAGNOSIS — R29.898 DECREASED GRIP STRENGTH OF LEFT HAND: ICD-10-CM

## 2018-07-19 DIAGNOSIS — M79.642 PAIN OF LEFT HAND: ICD-10-CM

## 2018-07-19 PROCEDURE — 97110 THERAPEUTIC EXERCISES: CPT | Performed by: PHYSICAL THERAPIST

## 2018-07-19 NOTE — PROGRESS NOTES
"Physical Therapy Daily Progress Note    Time In 1104  Time Out 1145    Luis Hill reports: hamstring tightness on right since getting out of bed.  Pt reports sleeping on stomach due to hip pain. Report from MD visit with hand doctor: pleased with progress, notes significant improvement in inflammation.  Plan for 1-3 weeks of PT and then resume normal daily activities, and follow up with MD in 6 weeks if needed.     Subjective     Objective       Palpation   Left   Hypertonic in the rectus femoris.   Tenderness of the rectus femoris.     Tenderness     Left Hip   Tenderness in the greater trochanter.     Tests     Left Hip   Negative ORTIZ, FADJERSEY and scour.      See Exercise, Manual, and Modality Logs for complete treatment.       Assessment & Plan     Assessment  Assessment details: Reviewed sleep position with pt and suggested left side-lying with pillow between knees for decreased stress on spine, pelvis and hip joint.  Pt c/o left hip \"popping\" during right step up activity and interfered with ability to perform step ups on right LE.  Pt was instructed to avoid hip hike during step up and pt continued to report discomfort in left hip.  Exercise was terminated and pt brought to table to assess left hip and associated complaints.  Special testing (-) for labral pathology or impingement.  Pt reported TTP in left hip flexor and gluteal tendon attachments to greater trochanter.  Pt educated on importance of strengthening bilateral hips and \"popping\" on left likely associated with weakness and some hypermobility.          Progress strengthening /stabilization /functional activity           Manual Therapy:    -     mins  88158;  Therapeutic Exercise:    38     mins  49018;     Neuromuscular Aracelis:    -    mins  46859;    Therapeutic Activity:     -     mins  58259;     Gait Training:      -     mins  74353;     Ultrasound:     -     mins  75075;    Electrical Stimulation:    -     mins  18919 ( );  Dry Needling    "  -     mins self-pay    Timed Treatment:   38   mins   Total Treatment:     41   mins    Anastacia Hdz, PT, DPT  Physical Therapist

## 2018-07-26 ENCOUNTER — TREATMENT (OUTPATIENT)
Dept: PHYSICAL THERAPY | Facility: CLINIC | Age: 23
End: 2018-07-26

## 2018-07-26 DIAGNOSIS — M76.31 ILIOTIBIAL BAND TENDINITIS OF RIGHT SIDE: Primary | ICD-10-CM

## 2018-07-26 DIAGNOSIS — R29.898 DECREASED GRIP STRENGTH OF LEFT HAND: ICD-10-CM

## 2018-07-26 DIAGNOSIS — M79.642 PAIN OF LEFT HAND: ICD-10-CM

## 2018-07-26 PROCEDURE — 97110 THERAPEUTIC EXERCISES: CPT | Performed by: PHYSICAL THERAPIST

## 2018-07-26 NOTE — PROGRESS NOTES
Physical Therapy Daily Progress Note    Time In 1432  Time Out 1512    Luis Azevedo reports: increased pain in left hip today.  Pt reports popping in bilateral hips     Subjective     Objective       Palpation   Left   Hypertonic in the gluteus medius and piriformis.   Tenderness of the gluteus medius and piriformis.     Tenderness     Left Hip   Tenderness in the greater trochanter.     Right Hip   Tenderness in the greater trochanter.     Tests     Left Hip   Negative ORTIZ, FADIR and scour.     Right Hip   Negative ORTIZ, FADIR and scour.      See Exercise, Manual, and Modality Logs for complete treatment.       Assessment & Plan     Assessment  Assessment details: Pt demonstrates decreased tolerance to activity and many exercises deferred and/or modified due to increased pain.  Special testing for internal hip pathology negative.  Pt continues to present with signs and symptoms associated with bilateral hip weakness and gluteal tendinitis.   Pt required review of sitting position/posture to decrease stress on tendons including feet flat on floor and avoiding IR or ER of hips.  Pt will benefit from re-assessment next visit and possible need for referral to MD if pt is regressing on reaching plateau towards goals.          Progress per Plan of Care  Re-evaluation next treatment with modification of treatment plan and possible referral to MD         Manual Therapy:    -     mins  25517;  Therapeutic Exercise:    38     mins  29171;     Neuromuscular Aracelis:    -    mins  34559;    Therapeutic Activity:     -     mins  20074;     Gait Training:      -     mins  18211;     Ultrasound:     -     mins  46712;    Electrical Stimulation:    -     mins  55553 ( );  Dry Needling     -     mins self-pay    Timed Treatment:   38   mins   Total Treatment:     40   mins    Anastacia Hdz, PT DPT  Physical Therapist

## 2018-08-02 ENCOUNTER — TELEPHONE (OUTPATIENT)
Dept: FAMILY MEDICINE CLINIC | Facility: CLINIC | Age: 23
End: 2018-08-02

## 2018-08-02 ENCOUNTER — TREATMENT (OUTPATIENT)
Dept: PHYSICAL THERAPY | Facility: CLINIC | Age: 23
End: 2018-08-02

## 2018-08-02 DIAGNOSIS — M76.31 ILIOTIBIAL BAND TENDINITIS OF RIGHT SIDE: Primary | ICD-10-CM

## 2018-08-02 DIAGNOSIS — M79.642 PAIN OF LEFT HAND: ICD-10-CM

## 2018-08-02 PROCEDURE — 97110 THERAPEUTIC EXERCISES: CPT | Performed by: PHYSICAL THERAPIST

## 2018-08-02 NOTE — TELEPHONE ENCOUNTER
----- Message from Sohail Roe MD sent at 8/2/2018  1:07 PM EDT -----  Regarding: RE: Patient Care  Anastacia,    Thank you very much for the note.  Next up will be to [facility] sports medicine.  I'm going to ask P[employer] to contact patient and set up referral.    Dr IRWIN        ----- Message -----  From: Anastacia Hdz, PT DPT  Sent: 8/2/2018  12:52 PM  To: Sohail Roe MD  Subject: Patient Care                                     Hi Dr. Roe,    I performed a re-assessment on Mr. Azevedo today.  My documentation is in Epic, but I wanted to let you know some of my thoughts personally.    He was previously making progress with the status of his right hip, but recently regressed and demonstrates decreased tolerance to activity including physical therapy.  His sitting posture including hip flexion and ER is likely aggravating his symptoms, and we have discussed modifications in length.  Due to his persistent pain and recent regression, I did recommend that he follow back up with you for further evaluation.  I did update his home exercises today that I expect him to continue working on in the interim.      Let me know if you need anything else from me, or would like to discuss further.     Thank you  Anastacia

## 2018-08-02 NOTE — PATIENT INSTRUCTIONS
See assessment   Updated HEP  Continued HEP performance  Please view [facility] Luis Azevedo for a complete list of HEP instructions.

## 2018-08-02 NOTE — PROGRESS NOTES
Re-Assessment / Re-Certification    Time In 1107     Time Out 1200    Patient: Luis Azevedo   : 1995  Diagnosis/ICD-10 Code:  Iliotibial band tendinitis of right side [M76.31]  Referring practitioner: Sohail Roe MD  Date of Initial Visit: 2018  Today's Date: 2018  Patient seen for 12 sessions      Subjective:   Luis Azevedo reports: sitting for 2 hours to write a paper and standing up to increased pain in right lateral hip extending down thigh.  Pt reports often sitting in position of hip ER.  Subjective Questionnaire: LEFS: 45/80  Clinical Progress: improved  Home Program Compliance: Yes  Treatment has included: therapeutic exercise, neuromuscular re-education, manual therapy, therapeutic activity, electrical stimulation, moist heat and cryotherapy    Subjective   Objective       Palpation     Right   No palpable tenderness to the gluteus medius and piriformis.     Tenderness     Right Hip   Tenderness in the greater trochanter.     Active Range of Motion     Lumbar   Flexion: WFL  Extension: WFL  Left lateral flexion: WFL  Right lateral flexion: WFL    Additional Active Range of Motion Details  Lumbar AROM WFL and pain-free except left lateral flexion pt c/o pull on right lateral hip    Passive Range of Motion     Right Hip   External rotation (90/90): 28 degrees with pain  Internal rotation (90/90): 32 degrees     Strength/Myotome Testing     Right Hip   Planes of Motion   Flexion: 4+  Extension: 4  Abduction: 4- (increased lateral hip pain)  Adduction: 4  External rotation: 4- (increased pain)  Internal rotation: 4+    Isolated Muscles   Gluteus maximums: 3+    Tests     Right Hip   Leon: Positive.     Additional Tests Details  Leon test R (+) iliopsoas and rectus femoris      Functional Assessment     Single Leg Stance   Right: 16 seconds     Assessment & Plan     Assessment  Assessment details: Pt required review of proper sitting position to decrease stress on hip joint and avoid  sustained position of hip in flexion and ER.  Pt educated to use position for stretching only and sit with both feet flat on ground and hips in neutral position for long-term duration.  Pt also required review of standing posture and importance of improving WB through R LE with hips in neutral position.  Pt is expected to improve with attention to and effort to improve standing and sitting patterns and postures.  However, due to duration of skilled physical therapy and regressions rather than steady progression within treatment plan, and pt continues to present with weakness and pain with hip ER, it is recommended that pt follow up with MD.  Pt may require further evaluation by orthopedist with additional treatment options in addition physical therapy.        Progress toward previous goals: Partially Met    New Goals  Short-term goals (STG): In 1 week:  1. ULTT median and ulnar nerve (-).    Long-term goals (LTG): In 3 weeks:  1. Pt will navigate stairs reciprocally without pain.  2. Pt will perform step down from 4 inch step without pain, hip IR or contralateral pelvic drop.  3. Right hip flexion, abduction and extension strength improve to 5-/5 without pain.  4. Right ITB, gluteus medius, and piriformis (-) TTP.  5. LEFS score improve to 60/80 to demonstrate functional improvement.   6. Left  strength improve to 103 lb without pain.  7. Left 2nd through 5th digits interphalangeal and metacarpal phalangeal joints AROM WNL and pain-free.  8. Pt will type and write as required for school without pain.      Recommendations: Continue with recommendations continued HEP performance and PT pending results of MD follow up  Timeframe: 3 weeks  Prognosis to achieve goals: fair    PT Signature: Anastacia Hdz, PT DPT      Based upon review of the patient's progress and continued therapy plan, it is my medical opinion that Luis zAevedo should [facility]ERAP[facility]  [address] [address] [address]  [phone].    Signature: __________________________________  oShail Roe MD    Manual Therapy:    -     mins  52691;  Therapeutic Exercise:    45     mins  27157;     Neuromuscular Aracelis:    -    mins  68592;    Therapeutic Activity:     -     mins  88972;     Gait Training:      -     mins  37063;     Ultrasound:     -     mins  37219;    Electrical Stimulation:    -     mins  29228 ( );  Dry Needling     -     mins self-pay    Timed Treatment:   45   mins   Total Treatment:     53   mins    Please sign and return via fax to [phone]. Thank you, [facility].

## 2018-08-08 ENCOUNTER — OFFICE VISIT (OUTPATIENT)
Dept: SPORTS MEDICINE | Facility: CLINIC | Age: 23
End: 2018-08-08

## 2018-08-08 VITALS
HEIGHT: 71 IN | DIASTOLIC BLOOD PRESSURE: 68 MMHG | SYSTOLIC BLOOD PRESSURE: 114 MMHG | WEIGHT: 174 LBS | BODY MASS INDEX: 24.36 KG/M2

## 2018-08-08 DIAGNOSIS — M61.151: ICD-10-CM

## 2018-08-08 DIAGNOSIS — M25.551 BILATERAL HIP PAIN: Primary | ICD-10-CM

## 2018-08-08 DIAGNOSIS — M25.552 BILATERAL HIP PAIN: Primary | ICD-10-CM

## 2018-08-08 PROCEDURE — 73521 X-RAY EXAM HIPS BI 2 VIEWS: CPT | Performed by: FAMILY MEDICINE

## 2018-08-08 PROCEDURE — 99244 OFF/OP CNSLTJ NEW/EST MOD 40: CPT | Performed by: FAMILY MEDICINE

## 2018-08-08 NOTE — PROGRESS NOTES
"Luis is a 22 y.o. year old male    Chief Complaint   Patient presents with   • Leg Pain     RT, referred        History of Present Illness  HPI     R hip pain: gradual onset February. NKT. Has been to PT for past 2 months. Thought to be IT band syndrome on both sides. Carried laptop bag on R shoulder and unsure if bag hitting hip exacerbated. Hips are \"hyper flexible.\" \"Pops easily.\" No back pain. \"Pressure\" sensation along outside of hip.    I have reviewed the patient's medical, family, and social history in detail and updated the computerized patient record.    Review of Systems   Constitutional: Negative for fever.   Musculoskeletal:        Per HPI   Skin: Negative for rash.   Neurological: Negative for weakness and numbness.   Psychiatric/Behavioral: Negative for sleep disturbance.   All other systems reviewed and are negative.      /68   Ht 180.3 cm (71\")   Wt 78.9 kg (174 lb)   BMI 24.27 kg/m²      Physical Exam    Vital signs reviewed.   General: No acute distress.  Eyes: conjunctiva clear; pupils equally round and reactive  ENT: external ears and nose atraumatic; oropharynx clear  CV: no peripheral edema, 2+ distal pulses  Resp: normal respiratory effort, no use of accessory muscles  Skin: no rashes or wounds; normal turgor  Psych: mood and affect appropriate; recent and remote memory intact  Neuro: sensation to light touch intact    MSK Exam:  Right Hip Exam     Tenderness   The patient is experiencing tenderness in the greater trochanter.    Range of Motion   The patient has normal right hip ROM.    Muscle Strength   The patient has normal right hip strength.    Tests   ORTIZ: negative      Left Hip Exam   Left hip exam is normal.          Bilateral Hip X-Ray  Indication: Pain  AP and Frog Leg views    Findings:  No fracture  Bony island distal to R greater trochanter c/w myositis ossificans  Normal soft tissues  Normal joint spaces    No prior studies were available for comparison.    Diagnoses " and all orders for this visit:    Bilateral hip pain  -     XR Hips Bilateral With or Without Pelvis 2 View  -     Ambulatory Referral to Orthopedic Surgery    Myositis ossificans progressiva of right thigh  -     Ambulatory Referral to Orthopedic Surgery      Discussed XR findings w/patient. No trauma, no hospitalizations in past. Refer to Dr. Espinoza for further consultation.    EMR Dragon/Transcription disclaimer:    Much of this encounter note is an electronic transcription/translation of spoken language to printed text.  The electronic translation of spoken language may permit erroneous, or at times, nonsensical words or phrases to be inadvertently transcribed.  Although I have reviewed the note for such errors some may still exist.

## 2018-09-18 ENCOUNTER — OFFICE VISIT (OUTPATIENT)
Dept: ORTHOPEDIC SURGERY | Facility: CLINIC | Age: 23
End: 2018-09-18

## 2018-09-18 VITALS — WEIGHT: 172.4 LBS | TEMPERATURE: 99.2 F | HEIGHT: 71 IN | BODY MASS INDEX: 24.14 KG/M2

## 2018-09-18 DIAGNOSIS — M25.551 PAIN OF RIGHT HIP JOINT: Primary | ICD-10-CM

## 2018-09-18 PROCEDURE — 99204 OFFICE O/P NEW MOD 45 MIN: CPT | Performed by: ORTHOPAEDIC SURGERY

## 2018-09-18 RX ORDER — DEXTROAMPHETAMINE SULFATE, DEXTROAMPHETAMINE SACCHARATE, AMPHETAMINE SULFATE AND AMPHETAMINE ASPARTATE 5; 5; 5; 5 MG/1; MG/1; MG/1; MG/1
CAPSULE, EXTENDED RELEASE ORAL
COMMUNITY
Start: 2018-09-17 | End: 2020-03-16 | Stop reason: SDUPTHER

## 2018-09-18 NOTE — PROGRESS NOTES
"Patient: Luis Azevedo  YOB: 1995 22 y.o. male  Medical Record Number: [id number]    Chief Complaints:   Chief Complaint   Patient presents with   • Right Hip - Establish Care, Pain       History of Present Illness:Luis Azevedo is a 22 y.o. male who presents with  Right lateral hip pain which is been present for about 8 months or so.  He has noticed a progressive increase in size of mass around the lateral aspect of his hip it is associating with pain.  Hurts when lays on his side or with ambulation.  He has pain with even walking short distances which he describes as a constant stabbing aching pain    Allergies: No Known Allergies    Medications:   Current Outpatient Prescriptions   Medication Sig Dispense Refill   • ADDERALL XR 20 MG 24 hr capsule      • amphetamine-dextroamphetamine (ADDERALL) 20 MG tablet Take 40 mg by mouth Daily.       No current facility-administered medications for this visit.          The following portions of the patient's history were reviewed and updated as appropriate: allergies, current medications, past family history, past medical history, past social history, past surgical history and problem list.    Review of Systems:   A 14 point review of systems was performed. All systems negative except pertinent positives/negative listed in HPI above    Physical Exam:   Vitals:    09/18/18 1130   Temp: 99.2 °F (37.3 °C)   TempSrc: Temporal Artery    Weight: 78.2 kg (172 lb 6.4 oz)   Height: 180.3 cm (71\")   PainSc:   7   PainLoc: Hip  Comment: right       General: A and O x 3, ASA, NAD    SCLERA:    Normal    DENTITION:   Normal   Hip:  right    LEG ALIGNMENT:     Neutral   ,    equal leg lengths    GAIT:     antalgic    SKIN:     No abnormality    RANGE OF MOTION:      Full without joint irritability    STRENGTH:     5 / 5    hip flexion and abduction    DISTAL PULSES:    Paplable    DISTAL SENSATION :   Intact    LYMPHATICS:     No   lymphadenopathy    OTHER:          - " Negative Stinchfeld test      - Negative log roll      +Tenderness to palpation trochanteric bursa       Radiology:  Xrays 2views right hip (AP bilateral hips, and lateral of the hip) taken at an [facility] institution were reviewed  demonstrating  What appears to  A broad-based osteochondroma about the lateral aspect of the femur at the vastus lateralis ridge measuring about 3 or 4 cm in length    Assessment/Plan: right proximal lateral femoral bone growth likely an osteochondroma.  Given the local symptomatology I think this would need to be resected.  I'm going to send him to Dr. Hernandez given his expertise and musculoskeletal oncology.      Jose Espinoza MD  9/18/2018

## 2020-03-16 ENCOUNTER — OFFICE VISIT (OUTPATIENT)
Dept: FAMILY MEDICINE CLINIC | Facility: CLINIC | Age: 25
End: 2020-03-16

## 2020-03-16 VITALS
TEMPERATURE: 98.3 F | HEART RATE: 92 BPM | DIASTOLIC BLOOD PRESSURE: 82 MMHG | RESPIRATION RATE: 16 BRPM | SYSTOLIC BLOOD PRESSURE: 116 MMHG | BODY MASS INDEX: 28.67 KG/M2 | HEIGHT: 71 IN | OXYGEN SATURATION: 99 % | WEIGHT: 204.8 LBS

## 2020-03-16 DIAGNOSIS — R50.9 FEVER, UNSPECIFIED FEVER CAUSE: ICD-10-CM

## 2020-03-16 DIAGNOSIS — J02.9 SORE THROAT: Primary | ICD-10-CM

## 2020-03-16 DIAGNOSIS — J06.9 ACUTE URI: ICD-10-CM

## 2020-03-16 LAB
EXPIRATION DATE: NORMAL
EXPIRATION DATE: NORMAL
FLUAV AG NPH QL: NEGATIVE
FLUBV AG NPH QL: NEGATIVE
INTERNAL CONTROL: NORMAL
INTERNAL CONTROL: NORMAL
Lab: 6636
Lab: NORMAL
S PYO AG THROAT QL: NEGATIVE

## 2020-03-16 PROCEDURE — 99213 OFFICE O/P EST LOW 20 MIN: CPT | Performed by: NURSE PRACTITIONER

## 2020-03-16 PROCEDURE — 87880 STREP A ASSAY W/OPTIC: CPT | Performed by: NURSE PRACTITIONER

## 2020-03-16 PROCEDURE — 87804 INFLUENZA ASSAY W/OPTIC: CPT | Performed by: NURSE PRACTITIONER

## 2020-03-16 NOTE — PROGRESS NOTES
Subjective   Luis Azevedo is a 24 y.o. male.     Chief Complaint   Patient presents with   • Cough     pt states started late thursday    • Sore Throat   • Fever      HPI New patient to me, here with his girlfriend for fever, sore throat, cough that began this past Thursday. He had T max initially around 101, but has been afebrile today and did not check temp yesterday.    Does not get seasonal allergies.  Does not get flu vaccine and has not been around anyone known to have the flu.  No recent travel or exposure to PUI.    Using cough drops and they are somewhat helpful. Has cough meds, but not taken.     Denies allergies, asthma, COPD.     He is a student and school is currently closed.       Social History     Tobacco Use   • Smoking status: Never Smoker   • Smokeless tobacco: Never Used   Substance Use Topics   • Alcohol use: Yes     Comment: rare   • Drug use: No       The following portions of the patient's history were reviewed and updated as appropriate: allergies, current medications, past family history, past medical history, past social history, past surgical history and problem list.    Review of Systems   Constitutional: Positive for fatigue. Negative for chills.   HENT: Positive for congestion, postnasal drip, rhinorrhea and sore throat. Negative for ear discharge and ear pain.    Eyes: Negative for discharge, redness, itching (has been rubbing his eyes alot-although they are not itchy) and visual disturbance.   Respiratory: Positive for cough. Negative for chest tightness and wheezing.    Gastrointestinal: Negative for abdominal pain, diarrhea, nausea and vomiting.   Musculoskeletal: Positive for arthralgias and myalgias.   Skin: Positive for rash (this am secondary to rubbing around his eyes and face).   Allergic/Immunologic: Negative for environmental allergies.   Neurological: Negative for weakness and headaches.       Objective   Blood pressure 116/82, pulse 92, temperature 98.3 °F (36.8 °C),  "temperature source Oral, resp. rate 16, height 180.3 cm (71\"), weight 92.9 kg (204 lb 12.8 oz), SpO2 99 %.  Body mass index is 28.56 kg/m².    Physical Exam   Constitutional: He is oriented to person, place, and time. He appears well-developed and well-nourished. No distress.   Not particularly ill appearing   HENT:   Head: Normocephalic and atraumatic.   Right Ear: External ear and ear canal normal. Tympanic membrane is not erythematous. A middle ear effusion is present.   Left Ear: External ear and ear canal normal. Tympanic membrane is not erythematous. A middle ear effusion is present.   Mouth/Throat: Posterior oropharyngeal erythema present. No tonsillar exudate.   Eyes: Conjunctivae are normal. Right eye exhibits no discharge. Left eye exhibits no discharge.   Neck: Neck supple.   Cardiovascular: Normal rate and regular rhythm.   Pulmonary/Chest: Effort normal and breath sounds normal. No respiratory distress. He has no wheezes. He has no rales.   Abdominal: Soft. Bowel sounds are normal. There is no tenderness.   Musculoskeletal: He exhibits no deformity.   Gait smooth and steady   Lymphadenopathy:     He has no cervical adenopathy.   Neurological: He is alert and oriented to person, place, and time.   Skin: Skin is warm and dry. He is not diaphoretic.   Psychiatric: He has a normal mood and affect.   Nursing note and vitals reviewed.      Assessment   Problem List Items Addressed This Visit     None      Visit Diagnoses     Sore throat    -  Primary    Relevant Orders    POC Rapid Strep A (Completed)    Acute URI        Fever, unspecified fever cause        Relevant Orders    POC Influenza A / B (Completed)           Procedures           Impression and Plan:  Both flu and strep are both negative.  Most likely viral URI.  We discussed symptomatic treatment-stay hydrated, avoid contact with others.  Take OTC cough meds he currently has but has not taken.  Oral OTC decongestant may be helpful with runny nose " etc.  He does not meet current guidelines for coronavirus testing, recommend stay home until symptoms resolved. Call or seek emergent if worsening.     Girlfriend accompanying him today is not currently symptomatic.     If there is updated testing guidance available today will let him know.     Health Maintenance Due   Topic Date Due   • TDAP/TD VACCINES (1 - Tdap) 09/23/2005   • ANNUAL PHYSICAL  01/16/2019   • INFLUENZA VACCINE  08/01/2019              EMR Dragon/Transcription disclaimer:   Much of this encounter note is an electronic transcription/translation of spoken language to printed text. The electronic translation of spoken language may permit erroneous, or at times, nonsensical words or phrases to be inadvertently transcribed; Although I have reviewed the note for such errors, some may still exist.

## 2022-08-29 NOTE — PATIENT INSTRUCTIONS
Pathology and involved anatomy with pictures  Purpose of treatment  Appropriate response to treatment  HEP performance  Please view [facility] Luis Azevedo for a complete list of HEP instructions.     poor plus